# Patient Record
Sex: MALE | Race: ASIAN | Employment: OTHER | URBAN - METROPOLITAN AREA
[De-identification: names, ages, dates, MRNs, and addresses within clinical notes are randomized per-mention and may not be internally consistent; named-entity substitution may affect disease eponyms.]

---

## 2022-10-24 ENCOUNTER — HOSPITAL ENCOUNTER (EMERGENCY)
Dept: CT IMAGING | Age: 87
Discharge: HOME | End: 2022-10-27
Payer: MEDICARE

## 2022-10-24 ENCOUNTER — HOSPITAL ENCOUNTER (OUTPATIENT)
Age: 87
Setting detail: OBSERVATION
Discharge: HOME | End: 2022-10-25
Attending: EMERGENCY MEDICINE | Admitting: INTERNAL MEDICINE
Payer: MEDICARE

## 2022-10-24 ENCOUNTER — HOSPITAL ENCOUNTER (EMERGENCY)
Dept: GENERAL RADIOLOGY | Age: 87
Discharge: HOME | End: 2022-10-27
Payer: MEDICARE

## 2022-10-24 DIAGNOSIS — R07.9 CHEST PAIN, UNSPECIFIED TYPE: ICD-10-CM

## 2022-10-24 DIAGNOSIS — S00.03XA CONTUSION OF SCALP, INITIAL ENCOUNTER: ICD-10-CM

## 2022-10-24 DIAGNOSIS — R55 SYNCOPE AND COLLAPSE: Primary | ICD-10-CM

## 2022-10-24 LAB
ALBUMIN SERPL-MCNC: 3.9 G/DL (ref 3.2–4.6)
ALBUMIN/GLOB SERPL: 1 {RATIO} (ref 0.4–1.6)
ALP SERPL-CCNC: 77 U/L (ref 50–136)
ALT SERPL-CCNC: 24 U/L (ref 12–65)
ANION GAP SERPL CALC-SCNC: 6 MMOL/L (ref 2–11)
AST SERPL-CCNC: 29 U/L (ref 15–37)
BILIRUB SERPL-MCNC: 0.6 MG/DL (ref 0.2–1.1)
BUN SERPL-MCNC: 13 MG/DL (ref 8–23)
CALCIUM SERPL-MCNC: 9.3 MG/DL (ref 8.3–10.4)
CHLORIDE SERPL-SCNC: 100 MMOL/L (ref 101–110)
CO2 SERPL-SCNC: 29 MMOL/L (ref 21–32)
CREAT SERPL-MCNC: 0.87 MG/DL (ref 0.8–1.5)
EKG ATRIAL RATE: 77 BPM
EKG DIAGNOSIS: NORMAL
EKG P AXIS: 63 DEGREES
EKG P-R INTERVAL: 200 MS
EKG Q-T INTERVAL: 414 MS
EKG QRS DURATION: 134 MS
EKG QTC CALCULATION (BAZETT): 468 MS
EKG R AXIS: 52 DEGREES
EKG T AXIS: 50 DEGREES
EKG VENTRICULAR RATE: 77 BPM
ERYTHROCYTE [DISTWIDTH] IN BLOOD BY AUTOMATED COUNT: 11.9 % (ref 11.9–14.6)
GLOBULIN SER CALC-MCNC: 4 G/DL (ref 2.8–4.5)
GLUCOSE SERPL-MCNC: 122 MG/DL (ref 65–100)
HCT VFR BLD AUTO: 40.3 % (ref 41.1–50.3)
HGB BLD-MCNC: 14.2 G/DL (ref 13.6–17.2)
MCH RBC QN AUTO: 30.7 PG (ref 26.1–32.9)
MCHC RBC AUTO-ENTMCNC: 35.2 G/DL (ref 31.4–35)
MCV RBC AUTO: 87 FL (ref 82–102)
NRBC # BLD: 0 K/UL (ref 0–0.2)
PLATELET # BLD AUTO: 160 K/UL (ref 150–450)
PMV BLD AUTO: 9.5 FL (ref 9.4–12.3)
POTASSIUM SERPL-SCNC: 4.2 MMOL/L (ref 3.5–5.1)
PROT SERPL-MCNC: 7.9 G/DL (ref 6.3–8.2)
RBC # BLD AUTO: 4.63 M/UL (ref 4.23–5.6)
SODIUM SERPL-SCNC: 135 MMOL/L (ref 133–143)
TROPONIN I SERPL HS-MCNC: 5.5 PG/ML (ref 0–14)
TROPONIN I SERPL HS-MCNC: 6.3 PG/ML (ref 0–14)
TROPONIN I SERPL HS-MCNC: 9.3 PG/ML (ref 0–14)
WBC # BLD AUTO: 4.4 K/UL (ref 4.3–11.1)

## 2022-10-24 PROCEDURE — 96372 THER/PROPH/DIAG INJ SC/IM: CPT

## 2022-10-24 PROCEDURE — 6360000002 HC RX W HCPCS: Performed by: NURSE PRACTITIONER

## 2022-10-24 PROCEDURE — 36415 COLL VENOUS BLD VENIPUNCTURE: CPT

## 2022-10-24 PROCEDURE — 6370000000 HC RX 637 (ALT 250 FOR IP): Performed by: NURSE PRACTITIONER

## 2022-10-24 PROCEDURE — 85027 COMPLETE CBC AUTOMATED: CPT

## 2022-10-24 PROCEDURE — 71045 X-RAY EXAM CHEST 1 VIEW: CPT

## 2022-10-24 PROCEDURE — G0378 HOSPITAL OBSERVATION PER HR: HCPCS

## 2022-10-24 PROCEDURE — 93005 ELECTROCARDIOGRAM TRACING: CPT | Performed by: NURSE PRACTITIONER

## 2022-10-24 PROCEDURE — 72125 CT NECK SPINE W/O DYE: CPT

## 2022-10-24 PROCEDURE — 99285 EMERGENCY DEPT VISIT HI MDM: CPT | Performed by: EMERGENCY MEDICINE

## 2022-10-24 PROCEDURE — 80053 COMPREHEN METABOLIC PANEL: CPT

## 2022-10-24 PROCEDURE — 84484 ASSAY OF TROPONIN QUANT: CPT

## 2022-10-24 PROCEDURE — 2580000003 HC RX 258: Performed by: NURSE PRACTITIONER

## 2022-10-24 PROCEDURE — 70450 CT HEAD/BRAIN W/O DYE: CPT

## 2022-10-24 PROCEDURE — 93005 ELECTROCARDIOGRAM TRACING: CPT | Performed by: EMERGENCY MEDICINE

## 2022-10-24 RX ORDER — SODIUM CHLORIDE 9 MG/ML
INJECTION, SOLUTION INTRAVENOUS PRN
Status: DISCONTINUED | OUTPATIENT
Start: 2022-10-24 | End: 2022-10-25 | Stop reason: HOSPADM

## 2022-10-24 RX ORDER — METOPROLOL SUCCINATE 25 MG/1
25 TABLET, EXTENDED RELEASE ORAL DAILY
Status: DISCONTINUED | OUTPATIENT
Start: 2022-10-25 | End: 2022-10-25 | Stop reason: HOSPADM

## 2022-10-24 RX ORDER — POLYETHYLENE GLYCOL 3350 17 G/17G
17 POWDER, FOR SOLUTION ORAL DAILY PRN
Status: DISCONTINUED | OUTPATIENT
Start: 2022-10-24 | End: 2022-10-25 | Stop reason: HOSPADM

## 2022-10-24 RX ORDER — ACETAMINOPHEN 650 MG/1
650 SUPPOSITORY RECTAL EVERY 6 HOURS PRN
Status: DISCONTINUED | OUTPATIENT
Start: 2022-10-24 | End: 2022-10-25 | Stop reason: HOSPADM

## 2022-10-24 RX ORDER — SODIUM CHLORIDE, SODIUM LACTATE, POTASSIUM CHLORIDE, CALCIUM CHLORIDE 600; 310; 30; 20 MG/100ML; MG/100ML; MG/100ML; MG/100ML
INJECTION, SOLUTION INTRAVENOUS CONTINUOUS
Status: DISCONTINUED | OUTPATIENT
Start: 2022-10-24 | End: 2022-10-25 | Stop reason: HOSPADM

## 2022-10-24 RX ORDER — ATORVASTATIN CALCIUM 40 MG/1
40 TABLET, FILM COATED ORAL NIGHTLY
Status: DISCONTINUED | OUTPATIENT
Start: 2022-10-24 | End: 2022-10-25 | Stop reason: HOSPADM

## 2022-10-24 RX ORDER — ENOXAPARIN SODIUM 100 MG/ML
40 INJECTION SUBCUTANEOUS EVERY 24 HOURS
Status: DISCONTINUED | OUTPATIENT
Start: 2022-10-24 | End: 2022-10-25 | Stop reason: HOSPADM

## 2022-10-24 RX ORDER — EZETIMIBE 10 MG/1
10 TABLET ORAL DAILY
COMMUNITY

## 2022-10-24 RX ORDER — PANTOPRAZOLE SODIUM 40 MG/1
40 TABLET, DELAYED RELEASE ORAL
Status: DISCONTINUED | OUTPATIENT
Start: 2022-10-25 | End: 2022-10-25 | Stop reason: HOSPADM

## 2022-10-24 RX ORDER — TAMSULOSIN HYDROCHLORIDE 0.4 MG/1
CAPSULE ORAL DAILY
COMMUNITY

## 2022-10-24 RX ORDER — ONDANSETRON 2 MG/ML
4 INJECTION INTRAMUSCULAR; INTRAVENOUS EVERY 6 HOURS PRN
Status: DISCONTINUED | OUTPATIENT
Start: 2022-10-24 | End: 2022-10-25 | Stop reason: HOSPADM

## 2022-10-24 RX ORDER — ACETAMINOPHEN 325 MG/1
650 TABLET ORAL EVERY 6 HOURS PRN
Status: DISCONTINUED | OUTPATIENT
Start: 2022-10-24 | End: 2022-10-25 | Stop reason: HOSPADM

## 2022-10-24 RX ORDER — EZETIMIBE 10 MG/1
10 TABLET ORAL NIGHTLY
Status: DISCONTINUED | OUTPATIENT
Start: 2022-10-24 | End: 2022-10-25 | Stop reason: HOSPADM

## 2022-10-24 RX ORDER — SODIUM CHLORIDE 0.9 % (FLUSH) 0.9 %
5-40 SYRINGE (ML) INJECTION EVERY 12 HOURS SCHEDULED
Status: DISCONTINUED | OUTPATIENT
Start: 2022-10-24 | End: 2022-10-25 | Stop reason: HOSPADM

## 2022-10-24 RX ORDER — SODIUM CHLORIDE 0.9 % (FLUSH) 0.9 %
5-40 SYRINGE (ML) INJECTION PRN
Status: DISCONTINUED | OUTPATIENT
Start: 2022-10-24 | End: 2022-10-25 | Stop reason: HOSPADM

## 2022-10-24 RX ORDER — TAMSULOSIN HYDROCHLORIDE 0.4 MG/1
0.4 CAPSULE ORAL DAILY
Status: DISCONTINUED | OUTPATIENT
Start: 2022-10-25 | End: 2022-10-25 | Stop reason: HOSPADM

## 2022-10-24 RX ORDER — ASPIRIN 81 MG/1
81 TABLET, CHEWABLE ORAL DAILY
Status: DISCONTINUED | OUTPATIENT
Start: 2022-10-25 | End: 2022-10-25 | Stop reason: HOSPADM

## 2022-10-24 RX ORDER — ONDANSETRON 4 MG/1
4 TABLET, ORALLY DISINTEGRATING ORAL EVERY 8 HOURS PRN
Status: DISCONTINUED | OUTPATIENT
Start: 2022-10-24 | End: 2022-10-25 | Stop reason: HOSPADM

## 2022-10-24 RX ADMIN — ACETAMINOPHEN 650 MG: 325 TABLET, FILM COATED ORAL at 18:14

## 2022-10-24 RX ADMIN — SODIUM CHLORIDE, POTASSIUM CHLORIDE, SODIUM LACTATE AND CALCIUM CHLORIDE: 600; 310; 30; 20 INJECTION, SOLUTION INTRAVENOUS at 18:00

## 2022-10-24 RX ADMIN — SODIUM CHLORIDE, PRESERVATIVE FREE 10 ML: 5 INJECTION INTRAVENOUS at 20:26

## 2022-10-24 RX ADMIN — ENOXAPARIN SODIUM 40 MG: 100 INJECTION SUBCUTANEOUS at 17:07

## 2022-10-24 RX ADMIN — EZETIMIBE 10 MG: 10 TABLET ORAL at 20:26

## 2022-10-24 ASSESSMENT — ENCOUNTER SYMPTOMS
BACK PAIN: 0
EYE PAIN: 0
SHORTNESS OF BREATH: 0
VOMITING: 0
NAUSEA: 0
ABDOMINAL PAIN: 0
COUGH: 0

## 2022-10-24 ASSESSMENT — PAIN SCALES - GENERAL
PAINLEVEL_OUTOF10: 5
PAINLEVEL_OUTOF10: 5

## 2022-10-24 ASSESSMENT — PAIN - FUNCTIONAL ASSESSMENT: PAIN_FUNCTIONAL_ASSESSMENT: 0-10

## 2022-10-24 NOTE — ED TRIAGE NOTES
Pt states he started with chest pain this morning. Pt states when the chest pain started he felt dizzy and fell and hit his head. Pt states he now has pain and swelling to left side of head. Pt denies any shortness of breath. Pt states he called his family doctor and was told to come here because he has cardiac issues. Pt states he is unsure of what his cardiac issues are.

## 2022-10-24 NOTE — ED NOTES
TRANSFER - OUT REPORT:    Verbal report given to Adria Duffrosalia on Yamini Davies  being transferred to  for routine progression of patient care       Report consisted of patient's Situation, Background, Assessment and   Recommendations(SBAR). Information from the following report(s) Nurse Handoff Report, ED SBAR, and [facility] was reviewed with the receiving nurse. Lines:   Peripheral IV 10/24/22 Left Antecubital (Active)   Site Assessment Clean, dry & intact 10/24/22 1310   Line Status Blood return noted;Capped;Flushed 10/24/22 1310   Phlebitis Assessment No symptoms 10/24/22 1310   Infiltration Assessment 0 10/24/22 1310        Opportunity for questions and clarification was provided.       Patient transported with:  Aide Aj RN  10/24/22 5588

## 2022-10-24 NOTE — ACP (ADVANCE CARE PLANNING)
[facility] [place] [facility]  At the heart of better care     Advance Care Planning   Admit Date:  10/24/2022  1:01 PM   Name:  Delta Montanez   Age:  80 y.o. Sex:  male  :  1932   MRN:  [id number]   Room:  [facility]    Delta Montanez is able to make his own decisions:   Yes    If pt unable to make decisions, POA/surrogate decision maker:  Jah Rubalcava    Other people present:   NA    Patient / surrogate decision-maker directed code status:  Full code    Other ACP topics discussed, if applicable:   telemetry    Patient or surrogate consented to discussion of the current conditions, workup, management plans, prognosis, and the risk for further deterioration. Time spent: 30 minutes in direct discussion.       Signed:  NICHOLE Barreto CNP

## 2022-10-24 NOTE — H&P
Hospitalist History and Physical   Admit Date:  10/24/2022  1:01 PM   Name:  Quinton Kc   Age:  80 y.o. Sex:  male  :  1932   MRN:  [id number]   Room:      Presenting Complaint: Chest Pain and Head Injury     Reason(s) for Admission: Syncope and collapse [R55]     History of Present Illness:   Quinton Kc is a 80 y.o. male with medical history of CAD, BPH, HLD who presented with midsternal, nonradiating chest pressure onset yesterday and syncopal episode that occurred at home while he was standing. He denies any feeling of palpitations. He is followed by [facility] cardiology for CAD with stent placement 15 years prior, last seen in . 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 CT head and C spine negative for acute findings. CXR with questionable PNA. Afebrile, no leukocytosis. Patient denies any SOB or cough at home. Review of Systems:  10 systems reviewed and negative except as noted in HPI. Assessment & Plan:     Principal Problem:    Syncope and collapse  Plan: telemetry  Orthostatic vitals    Chest pressure:  Nonradiating. Onset yesterday  Telemetry  Trend troponins    BPH:  Flomax resumed    CAD:  ASA continued  Zetia continued, not on statin due to myalgias    HLD:  Zetia resumed        Anticipated discharge needs: Will have PT/OT Eval but anticipate dc tomorrow     Diet: ADULT DIET; Regular; Low Fat/Low Chol/High Fiber/AB; No Caffeine  VTE ppx: Lovenox SQ  Code status: Full Code    Hospital Problems:  Principal Problem:    Syncope and collapse  Resolved Problems:    * No resolved hospital problems. *       Past History:     History reviewed. No pertinent past medical history. History reviewed. No pertinent surgical history.      Social History     Tobacco Use    Smoking status: Not on file    Smokeless tobacco: Not on file   Substance Use Topics    Alcohol use: Not on file      Social History     Substance and Sexual Activity   Drug Use Not on file History reviewed. No pertinent family history. Immunization History   Administered Date(s) Administered    COVID-19, PFIZER PURPLE top, DILUTE for use, (age 15 y+), 30mcg/0.3mL 02/15/2021, 09/17/2021    Influenza Virus Vaccine 09/01/2014     No Known Allergies  Prior to Admit Medications:  No current outpatient medications      Objective:   Patient Vitals for the past 24 hrs:   Temp Pulse Resp BP SpO2   10/24/22 1247 98 °F (36.7 °C) 77 18 127/73 97 %       Oxygen Therapy  SpO2: 97 %    Estimated body mass index is 21.93 kg/m² as calculated from the following:    Height as of this encounter: 5' 7\" (1.702 m). Weight as of this encounter: 140 lb (63.5 kg). No intake or output data in the 24 hours ending 10/24/22 1556      Physical Exam:    Blood pressure 127/73, pulse 77, temperature 98 °F (36.7 °C), temperature source Oral, resp. rate 18, height 5' 7\" (1.702 m), weight 140 lb (63.5 kg), SpO2 97 %. General:    Well nourished. Head:  Normocephalic, atraumatic  Eyes:  Sclerae appear normal.  Pupils equally round. ENT:  Nares appear normal, no drainage. Moist oral mucosa  Neck:  No restricted ROM. Trachea midline   CV:   RRR. No m/r/g. No jugular venous distension. Lungs:   CTAB. No wheezing, rhonchi, or rales. Symmetric expansion. Abdomen: Bowel sounds present. Soft, nontender, nondistended. Extremities: No cyanosis or clubbing. No edema  Skin:     No rashes and normal coloration. Warm and dry. Neuro:  CN II-XII grossly intact. Sensation intact. A&Ox3  Psych:  Normal mood and affect.       I have personally reviewed labs and tests showing:  Recent Labs:  Recent Results (from the past 24 hour(s))   EKG 12 Lead    Collection Time: 10/24/22 12:43 PM   Result Value Ref Range    Ventricular Rate 77 BPM    Atrial Rate 77 BPM    P-R Interval 200 ms    QRS Duration 134 ms    Q-T Interval 414 ms    QTc Calculation (Bazett) 468 ms    P Axis 63 degrees    R Axis 52 degrees    T Axis 50 degrees Diagnosis Normal sinus rhythm    CBC    Collection Time: 10/24/22  1:09 PM   Result Value Ref Range    WBC 4.4 4.3 - 11.1 K/uL    RBC 4.63 4.23 - 5.6 M/uL    Hemoglobin 14.2 13.6 - 17.2 g/dL    Hematocrit 40.3 (L) 41.1 - 50.3 %    MCV 87.0 82.0 - 102.0 FL    MCH 30.7 26.1 - 32.9 PG    MCHC 35.2 (H) 31.4 - 35.0 g/dL    RDW 11.9 11.9 - 14.6 %    Platelets 332 955 - 328 K/uL    MPV 9.5 9.4 - 12.3 FL    nRBC 0.00 0.0 - 0.2 K/uL   Comprehensive Metabolic Panel    Collection Time: 10/24/22  1:09 PM   Result Value Ref Range    Sodium 135 133 - 143 mmol/L    Potassium 4.2 3.5 - 5.1 mmol/L    Chloride 100 (L) 101 - 110 mmol/L    CO2 29 21 - 32 mmol/L    Anion Gap 6 2 - 11 mmol/L    Glucose 122 (H) 65 - 100 mg/dL    BUN 13 8 - 23 MG/DL    Creatinine 0.87 0.8 - 1.5 MG/DL    Est, Glom Filt Rate >60 >60 ml/min/1.73m2    Calcium 9.3 8.3 - 10.4 MG/DL    Total Bilirubin 0.6 0.2 - 1.1 MG/DL    ALT 24 12 - 65 U/L    AST 29 15 - 37 U/L    Alk Phosphatase 77 50 - 136 U/L    Total Protein 7.9 6.3 - 8.2 g/dL    Albumin 3.9 3.2 - 4.6 g/dL    Globulin 4.0 2.8 - 4.5 g/dL    Albumin/Globulin Ratio 1.0 0.4 - 1.6     Troponin    Collection Time: 10/24/22  1:09 PM   Result Value Ref Range    Troponin, High Sensitivity 5.5 0 - 14 pg/mL       I have personally reviewed imaging studies showing:  CT HEAD WO CONTRAST    Result Date: 10/24/2022  CT of the head without contrast. CLINICAL INDICATION: Dizziness after head trauma from a fall PROCEDURE: Serial thin section axial images are obtained from the cranial vertex through the skull base without the administration of intravenous contrast. Radiation dose reduction techniques were used for this study. Our CT scanners use one or all of the following: Automated exposure control, adjusted of the mA and/or kV according to patient size, iterative reconstruction COMPARISON: No prior. FINDINGS: There is no acute intracranial hemorrhage, mass, or mass effect.  No abnormal extra-axial fluid collections identified. There is no hydrocephalus. The basilar cisterns are widely patent. The there is moderate bilateral cerebral atrophy. The gray-white brain parenchymal interface is maintained. The small amount of edema is noted over the left parietotemporal scalp without a discrete hematoma. No skull fracture or aggressive osseous lesion noted. The mastoid air cells and included paranasal sinuses are clear. 1. No acute intracranial abnormality. 2. Moderate cerebral atrophy. 3. Small amount of scalp edema or contusion along the left parietal temporal scalp without underlying skull fracture. CT CERVICAL SPINE WO CONTRAST    Result Date: 10/24/2022  CT of the cervical spine without contrast. CLINICAL INDICATION: Neck trauma after a fall PROCEDURE: Serial thin section axial images obtained through the cervical spine without the administration of intravenous contrast. Radiation dose reduction techniques were used for this study. Our CT scanners use one or all of the following: Automated exposure control, adjusted of the MA, or KV according to patient size, and iterative reconstruction. COMPARISON:No prior FINDINGS: The cervical vertebral bodies are normal in height and alignment. There is no fracture. Degenerative disc changes are noted at C3-C4, C5-C6 and C6-C7. The posterior elements are intact. Spinous processes are intact. The C1-C2 articulation is intact and normal. The craniocervical junction is normal. Axial images: The anterior and posterior arches of C1 are intact. The foramina transversarium are patent throughout. No fractures identified on the axial images. Limited evaluation the paraspinal soft tissues is unremarkable. 1. No acute osseous abnormality or malalignment of the cervical spine. 2. Multilevel degenerative spondylosis     XR CHEST PORTABLE    Result Date: 10/24/2022  Portable chest x-ray Clinical indications: Chest pain FINDINGS: Single AP view the chest submitted. There is no pleural effusion. There is no pneumothorax. The mediastinum is unremarkable. There is patchy opacity obscured the right heart border. A right middle lobe infiltrate cannot be excluded on this examination. The bones are normal.     Questionable opacity in the right middle lobe. Pneumonia cannot be excluded on this exam.      Echocardiogram:  No results found for this or any previous visit.         Orders Placed This Encounter   Medications    sodium chloride flush 0.9 % injection 5-40 mL    sodium chloride flush 0.9 % injection 5-40 mL    0.9 % sodium chloride infusion    OR Linked Order Group     ondansetron (ZOFRAN-ODT) disintegrating tablet 4 mg     ondansetron (ZOFRAN) injection 4 mg    OR Linked Order Group     acetaminophen (TYLENOL) tablet 650 mg     acetaminophen (TYLENOL) suppository 650 mg    polyethylene glycol (GLYCOLAX) packet 17 g    aspirin chewable tablet 81 mg    atorvastatin (LIPITOR) tablet 40 mg    enoxaparin (LOVENOX) injection 40 mg     Order Specific Question:   Indication of Use     Answer:   Prophylaxis-DVT/PE    ezetimibe (ZETIA) tablet 10 mg         Signed:  NICHOLE Tavarez - CNP

## 2022-10-24 NOTE — ED PROVIDER NOTES
[employer] Emergency Department Provider Note                   PCP:                Jabari Martinez MD               Age: 80 y.o. Sex: male       Tra Duckwroth is a 80 y.o. male who presents to the Emergency Department with chief complaint of    Chief Complaint   Patient presents with    Chest Pain    Head Injury      Patient states that yesterday he developed a midsternal chest pain. He describes as a mild pressure sensation that has been constant since yesterday. Patient states he has had this previously with cardiac issues. Patient states this morning around 8 AM he was standing and became lightheaded. He states he had a syncopal episode falling to the ground. He is unsure how long he was unconscious. Patient does have some mild pain and swelling to his left temple. Patient also has an abrasion to his left elbow but no pain. He states he has never passed out before. Patient denied any palpitations or shortness of breath during this episode today. He states he has been feeling well recently. The history is provided by the patient. All other systems reviewed and are negative. Review of Systems   Constitutional:  Negative for fatigue and fever. HENT:  Negative for nosebleeds. No facial injury   Eyes:  Negative for pain and visual disturbance. Respiratory:  Negative for cough and shortness of breath. Cardiovascular:  Positive for chest pain. Negative for palpitations and leg swelling. Gastrointestinal:  Negative for abdominal pain, nausea and vomiting. Genitourinary:  Negative for flank pain. Musculoskeletal:  Negative for arthralgias, back pain, myalgias and neck pain. Skin:  Positive for wound. Neurological:  Positive for dizziness. Negative for weakness, numbness and headaches. Psychiatric/Behavioral:  Negative for confusion. History reviewed. No pertinent past medical history. History reviewed. No pertinent surgical history. History reviewed.  No pertinent family history. Social History     Socioeconomic History    Marital status:      Spouse name: None    Number of children: None    Years of education: None    Highest education level: None        Allergies: Patient has no known allergies. Previous Medications    No medications on file        Vitals signs and nursing note reviewed. Patient Vitals for the past 4 hrs:   Temp Pulse Resp BP SpO2   10/24/22 1247 98 °F (36.7 °C) 77 18 127/73 97 %          Physical Exam  Vitals and nursing note reviewed. Constitutional:       Appearance: Normal appearance. HENT:      Head: Normocephalic. Comments: Mild swelling and tenderness to left temple. No facial tenderness or swelling     Nose: Nose normal.   Eyes:      Pupils: Pupils are equal, round, and reactive to light. Cardiovascular:      Rate and Rhythm: Normal rate and regular rhythm. Pulses: Normal pulses. Heart sounds: Normal heart sounds. Pulmonary:      Effort: Pulmonary effort is normal.      Breath sounds: Normal breath sounds. Chest:      Chest wall: No tenderness. Abdominal:      General: Abdomen is flat. Palpations: Abdomen is soft. Tenderness: There is no abdominal tenderness. There is no right CVA tenderness, left CVA tenderness, guarding or rebound. Musculoskeletal:         General: No swelling or tenderness. Normal range of motion. Cervical back: Normal range of motion and neck supple. No tenderness. Comments: Compartments are soft   Skin:     General: Skin is warm and dry. Neurological:      General: No focal deficit present. Mental Status: He is alert and oriented to person, place, and time. Sensory: No sensory deficit. Motor: No weakness.    Psychiatric:         Behavior: Behavior normal.        Orders Placed This Encounter   Procedures    CT HEAD WO CONTRAST    CT CERVICAL SPINE WO CONTRAST    XR CHEST PORTABLE    CBC    Comprehensive Metabolic Panel    Troponin Troponin    CBC    Basic Metabolic Panel w/ Reflex to MG    ADULT DIET;  Regular; Low Fat/Low Chol/High Fiber/AB; No Caffeine    Cardiac Monitor    Pulse Oximetry    Orthostatic blood pressure and pulse    Vital signs per unit routine    Daily weights    Intake and output    Telemetry monitoring - 24 hour duration    Notify physician    Up with assistance    Orthostatic blood pressure and pulse    Full code    OT eval and treat    PT evaluation and treat    Initiate Oxygen Therapy Protocol    EKG 12 Lead    EKG 12 lead    EKG 12 lead    Saline lock IV    Place in Observation Service       Procedures    ED EKG Interpretation  EKG was interpreted in the absence of a cardiologist.    Rate: Rate: Normal  EKG Interpretation: EKG Interpretation: sinus rhythm  ST Segments: Nonspecific ST segments - NO STEMI  Right bundle branch block which is old    Results Include:    Recent Results (from the past 24 hour(s))   EKG 12 Lead    Collection Time: 10/24/22 12:43 PM   Result Value Ref Range    Ventricular Rate 77 BPM    Atrial Rate 77 BPM    P-R Interval 200 ms    QRS Duration 134 ms    Q-T Interval 414 ms    QTc Calculation (Bazett) 468 ms    P Axis 63 degrees    R Axis 52 degrees    T Axis 50 degrees    Diagnosis Normal sinus rhythm    CBC    Collection Time: 10/24/22  1:09 PM   Result Value Ref Range    WBC 4.4 4.3 - 11.1 K/uL    RBC 4.63 4.23 - 5.6 M/uL    Hemoglobin 14.2 13.6 - 17.2 g/dL    Hematocrit 40.3 (L) 41.1 - 50.3 %    MCV 87.0 82.0 - 102.0 FL    MCH 30.7 26.1 - 32.9 PG    MCHC 35.2 (H) 31.4 - 35.0 g/dL    RDW 11.9 11.9 - 14.6 %    Platelets 135 948 - 186 K/uL    MPV 9.5 9.4 - 12.3 FL    nRBC 0.00 0.0 - 0.2 K/uL   Comprehensive Metabolic Panel    Collection Time: 10/24/22  1:09 PM   Result Value Ref Range    Sodium 135 133 - 143 mmol/L    Potassium 4.2 3.5 - 5.1 mmol/L    Chloride 100 (L) 101 - 110 mmol/L    CO2 29 21 - 32 mmol/L    Anion Gap 6 2 - 11 mmol/L    Glucose 122 (H) 65 - 100 mg/dL    BUN 13 8 - 23 MG/DL Creatinine 0.87 0.8 - 1.5 MG/DL    Est, Glom Filt Rate >60 >60 ml/min/1.73m2    Calcium 9.3 8.3 - 10.4 MG/DL    Total Bilirubin 0.6 0.2 - 1.1 MG/DL    ALT 24 12 - 65 U/L    AST 29 15 - 37 U/L    Alk Phosphatase 77 50 - 136 U/L    Total Protein 7.9 6.3 - 8.2 g/dL    Albumin 3.9 3.2 - 4.6 g/dL    Globulin 4.0 2.8 - 4.5 g/dL    Albumin/Globulin Ratio 1.0 0.4 - 1.6     Troponin    Collection Time: 10/24/22  1:09 PM   Result Value Ref Range    Troponin, High Sensitivity 5.5 0 - 14 pg/mL          CT HEAD WO CONTRAST   Final Result   1. No acute intracranial abnormality. 2. Moderate cerebral atrophy. 3. Small amount of scalp edema or contusion along the left parietal temporal   scalp without underlying skull fracture. CT CERVICAL SPINE WO CONTRAST   Final Result   1. No acute osseous abnormality or malalignment of the cervical spine. 2. Multilevel degenerative spondylosis         XR CHEST PORTABLE   Final Result   Questionable opacity in the right middle lobe. Pneumonia cannot be   excluded on this exam.                         ED Course as of 10/24/22 1533   Mon Oct 24, 2022   1508 Patient is resting comfortably in bed. I explained the results to patient as well as his son (on the phone) who is a physician. They are comfortable with admission. [CW]      ED Course User Index  [CW] Micehlle Crabtree MD       [facility]  Number of Diagnoses or Management Options  Chest pain, unspecified type  Syncope and collapse  Diagnosis management comments: Patient presented for evaluation of syncope which occurred earlier today. Patient is also been having some mild chest pain since yesterday. Patient's EKG showed normal sinus rhythm with a right bundle branch block which is old. No acute ST segment changes. Patient's troponin is negative and ACS has been ruled out. Patient's chest x-ray showed no acute findings. Patient does have history of coronary disease with previous PCI 15 years ago.   His chest pain controlled in the ED. Patient did strike his head during syncopal episode. His head CT showed scalp soft tissue swelling but no skull fracture or intracerebral hemorrhage. Because of patient's advanced age and cardiac history, hospitalist was consulted for admission. Explanation: The diagnosis and plan as well as the results of any testing and treatments today in the department were communicated to the patient and/or their family/caregiver (if present). The patient/caregiver verbalized understanding and realize that they are being admitted to the hospital for further evaluation and treatment. All questions were answered at bedside. ICD-10-CM    1. Syncope and collapse  R55       2. Chest pain, unspecified type  R07.9           DISPOSITION Admitted 10/24/2022 03:30:50 PM       Voice dictation software was used during the making of this note. This software is not perfect and grammatical and other typographical errors may be present. This note has not been completely proofread for errors.      Eliza Puri MD  10/24/22 8932

## 2022-10-24 NOTE — PROGRESS NOTES
Monitor room called, stated patient appeared to have 2nd degree type 2 rhythm. Also orthostatics on patient completed: 150/95 sitting and 132/62 standing.  Informed Ms. Page Gutierrez NP

## 2022-10-25 ENCOUNTER — APPOINTMENT (OUTPATIENT)
Dept: GENERAL RADIOLOGY | Age: 87
End: 2022-10-25
Payer: MEDICARE

## 2022-10-25 VITALS
HEART RATE: 78 BPM | BODY MASS INDEX: 21.97 KG/M2 | TEMPERATURE: 98.1 F | RESPIRATION RATE: 18 BRPM | OXYGEN SATURATION: 95 % | WEIGHT: 140 LBS | DIASTOLIC BLOOD PRESSURE: 70 MMHG | SYSTOLIC BLOOD PRESSURE: 123 MMHG | HEIGHT: 67 IN

## 2022-10-25 PROBLEM — I95.1 ORTHOSTATIC HYPOTENSION: Status: ACTIVE | Noted: 2022-10-25

## 2022-10-25 PROBLEM — R55 SYNCOPE AND COLLAPSE: Status: RESOLVED | Noted: 2022-10-24 | Resolved: 2022-10-25

## 2022-10-25 LAB
ANION GAP SERPL CALC-SCNC: 2 MMOL/L (ref 2–11)
BUN SERPL-MCNC: 10 MG/DL (ref 8–23)
CALCIUM SERPL-MCNC: 8.8 MG/DL (ref 8.3–10.4)
CHLORIDE SERPL-SCNC: 103 MMOL/L (ref 101–110)
CO2 SERPL-SCNC: 28 MMOL/L (ref 21–32)
CREAT SERPL-MCNC: 0.79 MG/DL (ref 0.8–1.5)
EKG ATRIAL RATE: 77 BPM
EKG DIAGNOSIS: NORMAL
EKG P AXIS: 63 DEGREES
EKG P-R INTERVAL: 200 MS
EKG Q-T INTERVAL: 414 MS
EKG QRS DURATION: 134 MS
EKG QTC CALCULATION (BAZETT): 468 MS
EKG R AXIS: 52 DEGREES
EKG T AXIS: 50 DEGREES
EKG VENTRICULAR RATE: 77 BPM
ERYTHROCYTE [DISTWIDTH] IN BLOOD BY AUTOMATED COUNT: 11.8 % (ref 11.9–14.6)
GLUCOSE SERPL-MCNC: 126 MG/DL (ref 65–100)
HCT VFR BLD AUTO: 37 % (ref 41.1–50.3)
HGB BLD-MCNC: 13.4 G/DL (ref 13.6–17.2)
MCH RBC QN AUTO: 31.2 PG (ref 26.1–32.9)
MCHC RBC AUTO-ENTMCNC: 36.2 G/DL (ref 31.4–35)
MCV RBC AUTO: 86.2 FL (ref 82–102)
NRBC # BLD: 0 K/UL (ref 0–0.2)
PLATELET # BLD AUTO: 140 K/UL (ref 150–450)
PMV BLD AUTO: 9.6 FL (ref 9.4–12.3)
POTASSIUM SERPL-SCNC: 4 MMOL/L (ref 3.5–5.1)
RBC # BLD AUTO: 4.29 M/UL (ref 4.23–5.6)
SODIUM SERPL-SCNC: 133 MMOL/L (ref 133–143)
WBC # BLD AUTO: 3.9 K/UL (ref 4.3–11.1)

## 2022-10-25 PROCEDURE — 97535 SELF CARE MNGMENT TRAINING: CPT

## 2022-10-25 PROCEDURE — 85027 COMPLETE CBC AUTOMATED: CPT

## 2022-10-25 PROCEDURE — G0378 HOSPITAL OBSERVATION PER HR: HCPCS

## 2022-10-25 PROCEDURE — 97165 OT EVAL LOW COMPLEX 30 MIN: CPT

## 2022-10-25 PROCEDURE — 80048 BASIC METABOLIC PNL TOTAL CA: CPT

## 2022-10-25 PROCEDURE — 97530 THERAPEUTIC ACTIVITIES: CPT

## 2022-10-25 PROCEDURE — 71045 X-RAY EXAM CHEST 1 VIEW: CPT

## 2022-10-25 PROCEDURE — 6370000000 HC RX 637 (ALT 250 FOR IP): Performed by: NURSE PRACTITIONER

## 2022-10-25 PROCEDURE — 97161 PT EVAL LOW COMPLEX 20 MIN: CPT

## 2022-10-25 PROCEDURE — 2580000003 HC RX 258: Performed by: NURSE PRACTITIONER

## 2022-10-25 PROCEDURE — 36415 COLL VENOUS BLD VENIPUNCTURE: CPT

## 2022-10-25 RX ADMIN — ASPIRIN 81 MG: 81 TABLET, CHEWABLE ORAL at 08:16

## 2022-10-25 RX ADMIN — PANTOPRAZOLE SODIUM 40 MG: 40 TABLET, DELAYED RELEASE ORAL at 05:50

## 2022-10-25 RX ADMIN — SODIUM CHLORIDE, POTASSIUM CHLORIDE, SODIUM LACTATE AND CALCIUM CHLORIDE: 600; 310; 30; 20 INJECTION, SOLUTION INTRAVENOUS at 05:51

## 2022-10-25 NOTE — PROGRESS NOTES
ACUTE PHYSICAL THERAPY GOALS:   (Developed with and agreed upon by patient and/or caregiver.)  STG:  (1.)Mr. Asher Humphrey will move from supine to sit and sit to supine  with INDEPENDENT within 7 treatment day(s). (2.)Mr. Asher Humphrey will transfer from bed to chair and chair to bed with SUPERVISION using the least restrictive device within 7 treatment day(s). (3.)Mr. Asher Humphrey will ambulate with SUPERVISION for 500 feet with the least restrictive device within 7 treatment day(s). (4)Mr. Asher Humphrey will perform HEP with cues and assistance to increase safety on his feet in 7 days. ________________________________________________________________________________________________      PHYSICAL THERAPY Initial Assessment and AM  (Link to Caseload Tracking: PT Visit Days : 1  Acknowledge Orders  Time In/Out  PT Charge Capture  Rehab Caseload Tracker    Torrie Finn is a 80 y.o. male   PRIMARY DIAGNOSIS: Syncope and collapse  Syncope and collapse [R55]  Contusion of scalp, initial encounter [S00.03XA]  Chest pain, unspecified type [R07.9]       Reason for Referral: Other abnormalities of gait and mobility (R26.89)  Observation: Payor: MEDICARE / Plan: MEDICARE PART A AND B / Product Type: *No Product type* /     ASSESSMENT:     REHAB RECOMMENDATIONS:   Recommendation to date pending progress:  Setting:  No further skilled therapy after discharge from hospital    Equipment:    None     ASSESSMENT:  Mr. Asher Humphrey presents as feeling well this am admitted with above diagnosis after syncope at home. He is normally independent and drives and helps take care of his wife. This am, he feels well. On contact, pt. Walking in [place] with son(lives in [facility]). He returned to room. He has a little pain on left side of head and mild swelling from the fall. LE strength and coordination good and symmetrical.  His BP's were taking and noted below. He had no dizziness throughout.   He was able to transfer and ambulate SBA in the christian.  Educated on ways to decrease risk of falling. Will follow one more visit if he does not go home today.        [address] [facility] [address] [address] AM-[facility] 6 Clicks Basic Mobility Inpatient Short Form  AM-PAC Mobility Inpatient   How much difficulty turning over in bed?: None  How much difficulty sitting down on / standing up from a chair with arms?: None  How much difficulty moving from lying on back to sitting on side of bed?: None  How much help from another person moving to and from a bed to a chair?: None  How much help from another person needed to walk in hospital room?: None  How much help from another person for climbing 3-5 steps with a railing?: A Little  AM-[facility] Inpatient Mobility Raw Score : 23  AM-PAC Inpatient T-Scale Score : 56.93  Mobility Inpatient CMS 0-100% Score: 11.2  Mobility Inpatient CMS G-Code Modifier : CI    SUBJECTIVE:   Mr. Leon Police states, \"good\"     Social/Functional Lives With: Spouse  Type of Home: House  Home Layout: Able to Live on Main level with bedroom/bathroom  Home Access: Level entry    OBJECTIVE:     PAIN: Denice Min / O2: Kristen Peters / Vicky Yo / Collin Dixie:   Pre Treatment:          Post Treatment: mild head pain left side, did not rate, did not change with movement Vitals   Vitals  BP: 123/70 (supine)  Blood Pressure Sittin/76  Blood Pressure Standin/63    Oxygen      None    RESTRICTIONS/PRECAUTIONS:                    GROSS EVALUATION: Intact Impaired (Comments):   AROM [x]  LE's   PROM []    Strength [x]  LE's   Balance [] Sitting - Static: Good  Sitting - Dynamic: Good  Standing - Static: Good  Standing - Dynamic: Good, Fair Neg rhomberg, single leg stance one sec each LE with recovery on his own, can do heel and toe raises   Posture [] Rounded Shoulders   Sensation [x]     Coordination [x]      Tone []     Edema []    Activity Tolerance [x]      []      COGNITION/  PERCEPTION: Intact Impaired (Comments):   Orientation [x]     Vision []  glasses   Hearing [x]     Cognition  [x] MOBILITY: I Mod I S SBA CGA Min Mod Max Total  NT x2 Comments:   Bed Mobility    Rolling [] [] [] [] [] [] [] [] [] [] []    Supine to Sit [] [] [x] [] [] [] [] [] [] [] []    Scooting [] [] [] [] [] [] [] [] [] [] []    Sit to Supine [] [] [x] [] [] [] [] [] [] [] []    Transfers    Sit to Stand [] [] [] [x] [] [] [] [] [] [] []    Bed to Chair [] [] [] [x] [] [] [] [] [] [] []    Stand to Sit [] [] [] [x] [] [] [] [] [] [] []     [] [] [] [] [] [] [] [] [] [] []    I=Independent, Mod I=Modified Independent, S=Supervision, SBA=Standby Assistance, CGA=Contact Guard Assistance,   Min=Minimal Assistance, Mod=Moderate Assistance, Max=Maximal Assistance, Total=Total Assistance, NT=Not Tested    GAIT: I Mod I S SBA CGA Min Mod Max Total  NT x2 Comments:   Level of Assistance [] [] [] [x] [] [] [] [] [] [] []    Distance 200 feet    DME None    Gait Quality Decreased cher     Weightbearing Status      Stairs  No steps at home    I=Independent, Mod I=Modified Independent, S=Supervision, SBA=Standby Assistance, CGA=Contact Guard Assistance,   Min=Minimal Assistance, Mod=Moderate Assistance, Max=Maximal Assistance, Total=Total Assistance, NT=Not Tested    PLAN:   FREQUENCY AND DURATION: Daily for duration of hospital stay or until stated goals are met, whichever comes first.    THERAPY PROGNOSIS: Good    PROBLEM LIST:   (Skilled intervention is medically necessary to address:)  Decreased Activity Tolerance  Decreased AROM/PROM  Decreased Balance  Decreased Gait Ability  Decreased Transfer Abilities INTERVENTIONS PLANNED:   (Benefits and precautions of physical therapy have been discussed with the patient.)  Therapeutic Activity  Therapeutic Exercise/HEP  Gait Training  Education       TREATMENT:   EVALUATION: LOW COMPLEXITY: (Untimed Charge)    TREATMENT:   Therapeutic Activity (15 Minutes):  Therapeutic activity included Supine to Sit, Sit to Supine, Transfer Training, Ambulation on level ground, Sitting balance , Standing balance, and education on not falling and reducing risk in home to improve functional Activity tolerance, Balance, Coordination, Mobility, Strength, and ROM. TREATMENT GRID:  N/A    AFTER TREATMENT PRECAUTIONS: Bed, Bed/Chair Locked, Call light within reach, Needs within reach, and Visitors at bedside    INTERDISCIPLINARY COLLABORATION:  RN/ PCT, PT/ PTA, and OT/ ELMORE    EDUCATION: Education Given To: Patient; Family  Education Provided: Role of Therapy; Fall Prevention Strategies  Education Method: Demonstration;Verbal  Education Outcome: Verbalized understanding    TIME IN/OUT:  Time In: 0805  Time Out: 0830  Minutes: FELIBERTO Umanzor

## 2022-10-25 NOTE — DISCHARGE SUMMARY
Hospitalist Discharge Summary   Admit Date:  10/24/2022  1:01 PM   DC Note date: 10/25/2022  Name:  Sharon Vazquez   Age:  80 y.o. Sex:  male  :  1932   MRN:  [id number]   Room:  [facility]  PCP:  Debbi Orozco MD    Presenting Complaint: Chest Pain and Head Injury     Initial Admission Diagnosis: Syncope and collapse [R55]  Contusion of scalp, initial encounter [S00.03XA]  Chest pain, unspecified type [R07.9]     Problem List for this Hospitalization (present on admission):    Principal Problem (Resolved):    Syncope and collapse  Active Problems:    * No active hospital problems. [facility] Course:  Mr. Mary Bejarano is a nice 81 y/o WM with a h/o CAD s/p PCI 15 years ago who was admitted to our service on 10/24 for syncope. The day prior to admission he had 1 episode of mild chest pain that self-resolved. The following day he was ambulating to the kitchen when he suddenly got dizzy and seemed to pass out. This was unwitnessed by wife was at home. No seizure like activity. Labs unremarkable. EKG shows RBBB. CXR with a questionable R sided infiltrate but patient afebrile without leukocytosis or respiratory symptoms. CT head with L sided scalp edema. CT C-spine was negative for acute fracture but did show multilevel degenerative spondylosis. He was admitted and started on IVFs and remote telemetry. There was 1 brief episode after admission that looked like a type 2 AVB but this resolved spontaneously and has not recurred. His orthostatic VS were positive. Patient advised to stay off Flomax for a few days to ensure improvement at discharge. He should follow up with his Cardiologist and may need a monitor. Repeat CXR was unremarkable on 10/25. He is on RA w/o complaints. He was seen by PT and did well. He did have positive orthostatics with PT but he was not symptomatic during his session. I discussed the plan of care with his son who is an anesthesiologist from [facility].  He is medically stable for discharge home today. Disposition: Home  Diet: ADULT DIET; Regular; Low Fat/Low Chol/High Fiber/AB; No Caffeine  Code Status: Full Code    Follow Ups:  Cardiology    Time spent in patient discharge and coordination 35 minutes. Follow up labs/diagnostics (ultimately defer to outpatient provider):  N/A    Plan was discussed with patient and son, RN, CM. All questions answered. Patient was stable at time of discharge. Instructions given to call a physician or return if any concerns. Current Discharge Medication List        CONTINUE these medications which have NOT CHANGED    Details   tamsulosin (FLOMAX) 0.4 MG capsule Take by mouth daily      ezetimibe (ZETIA) 10 MG tablet Take 10 mg by mouth daily             Procedures done this admission:  * No surgery found *    Consults this admission:  None    Echocardiogram results:  No results found for this or any previous visit. Diagnostic Imaging/Tests:   CT HEAD WO CONTRAST    Result Date: 10/24/2022  1. No acute intracranial abnormality. 2. Moderate cerebral atrophy. 3. Small amount of scalp edema or contusion along the left parietal temporal scalp without underlying skull fracture. CT CERVICAL SPINE WO CONTRAST    Result Date: 10/24/2022  1. No acute osseous abnormality or malalignment of the cervical spine. 2. Multilevel degenerative spondylosis     XR CHEST PORTABLE    Result Date: 10/24/2022  Questionable opacity in the right middle lobe.  Pneumonia cannot be excluded on this exam.       Labs: Results:       BMP, Mg, Phos Recent Labs     10/24/22  1309 10/25/22  0638    133   K 4.2 4.0   * 103   CO2 29 28   ANIONGAP 6 2   BUN 13 10   CREATININE 0.87 0.79*   LABGLOM >60 >60   CALCIUM 9.3 8.8   GLUCOSE 122* 126*      CBC Recent Labs     10/24/22  1309 10/25/22  0638   WBC 4.4 3.9*   RBC 4.63 4.29   HGB 14.2 13.4*   HCT 40.3* 37.0*   MCV 87.0 86.2   MCH 30.7 31.2   MCHC 35.2* 36.2*   RDW 11.9 11.8*    140*   MPV 9.5 9.6   NRBC 0.00 0.00      LFT Recent Labs     10/24/22  1309   BILITOT 0.6   ALKPHOS 77   AST 29   ALT 24   PROT 7.9   LABALBU 3.9   GLOB 4.0      Cardiac  Lab Results   Component Value Date/Time    TROPHS 9.3 10/24/2022 07:52 PM    TROPHS 6.3 10/24/2022 04:23 PM    TROPHS 5.5 10/24/2022 01:09 PM      Coags No results found for: PROTIME, INR, APTT   A1c No results found for: LABA1C, EAG   Lipids No results found for: CHOL, LDLCALC, LABVLDL, HDL, CHOLHDLRATIO, TRIG   Thyroid  No results found for: Lizet Mems     Most Recent UA No results found for: COLORU, APPEARANCE, SPECGRAV, LABPH, PROTEINU, GLUCOSEU, KETUA, BILIRUBINUR, BLOODU, UROBILINOGEN, NITRU, LEUKOCYTESUR, WBCUA, RBCUA, EPITHUA, BACTERIA, LABCAST, MUCUS     No results for input(s): CULTURE in the last 720 hours.     All Labs from Last 24 Hrs:  Recent Results (from the past 24 hour(s))   EKG 12 Lead    Collection Time: 10/24/22 12:43 PM   Result Value Ref Range    Ventricular Rate 77 BPM    Atrial Rate 77 BPM    P-R Interval 200 ms    QRS Duration 134 ms    Q-T Interval 414 ms    QTc Calculation (Bazett) 468 ms    P Axis 63 degrees    R Axis 52 degrees    T Axis 50 degrees    Diagnosis Normal sinus rhythm    EKG 12 lead    Collection Time: 10/24/22 12:43 PM   Result Value Ref Range    Ventricular Rate 77 BPM    Atrial Rate 77 BPM    P-R Interval 200 ms    QRS Duration 134 ms    Q-T Interval 414 ms    QTc Calculation (Bazett) 468 ms    P Axis 63 degrees    R Axis 52 degrees    T Axis 50 degrees    Diagnosis Normal sinus rhythm    CBC    Collection Time: 10/24/22  1:09 PM   Result Value Ref Range    WBC 4.4 4.3 - 11.1 K/uL    RBC 4.63 4.23 - 5.6 M/uL    Hemoglobin 14.2 13.6 - 17.2 g/dL    Hematocrit 40.3 (L) 41.1 - 50.3 %    MCV 87.0 82.0 - 102.0 FL    MCH 30.7 26.1 - 32.9 PG    MCHC 35.2 (H) 31.4 - 35.0 g/dL    RDW 11.9 11.9 - 14.6 %    Platelets 927 580 - 657 K/uL    MPV 9.5 9.4 - 12.3 FL    nRBC 0.00 0.0 - 0.2 K/uL   Comprehensive Metabolic Panel    Collection Time: 10/24/22  1:09 PM   Result Value Ref Range    Sodium 135 133 - 143 mmol/L    Potassium 4.2 3.5 - 5.1 mmol/L    Chloride 100 (L) 101 - 110 mmol/L    CO2 29 21 - 32 mmol/L    Anion Gap 6 2 - 11 mmol/L    Glucose 122 (H) 65 - 100 mg/dL    BUN 13 8 - 23 MG/DL    Creatinine 0.87 0.8 - 1.5 MG/DL    Est, Glom Filt Rate >60 >60 ml/min/1.73m2    Calcium 9.3 8.3 - 10.4 MG/DL    Total Bilirubin 0.6 0.2 - 1.1 MG/DL    ALT 24 12 - 65 U/L    AST 29 15 - 37 U/L    Alk Phosphatase 77 50 - 136 U/L    Total Protein 7.9 6.3 - 8.2 g/dL    Albumin 3.9 3.2 - 4.6 g/dL    Globulin 4.0 2.8 - 4.5 g/dL    Albumin/Globulin Ratio 1.0 0.4 - 1.6     Troponin    Collection Time: 10/24/22  1:09 PM   Result Value Ref Range    Troponin, High Sensitivity 5.5 0 - 14 pg/mL   Troponin    Collection Time: 10/24/22  4:23 PM   Result Value Ref Range    Troponin, High Sensitivity 6.3 0 - 14 pg/mL   Troponin    Collection Time: 10/24/22  7:52 PM   Result Value Ref Range    Troponin, High Sensitivity 9.3 0 - 14 pg/mL   CBC    Collection Time: 10/25/22  6:38 AM   Result Value Ref Range    WBC 3.9 (L) 4.3 - 11.1 K/uL    RBC 4.29 4.23 - 5.6 M/uL    Hemoglobin 13.4 (L) 13.6 - 17.2 g/dL    Hematocrit 37.0 (L) 41.1 - 50.3 %    MCV 86.2 82.0 - 102.0 FL    MCH 31.2 26.1 - 32.9 PG    MCHC 36.2 (H) 31.4 - 35.0 g/dL    RDW 11.8 (L) 11.9 - 14.6 %    Platelets 509 (L) 780 - 450 K/uL    MPV 9.6 9.4 - 12.3 FL    nRBC 0.00 0.0 - 0.2 K/uL   Basic Metabolic Panel w/ Reflex to MG    Collection Time: 10/25/22  6:38 AM   Result Value Ref Range    Sodium 133 133 - 143 mmol/L    Potassium 4.0 3.5 - 5.1 mmol/L    Chloride 103 101 - 110 mmol/L    CO2 28 21 - 32 mmol/L    Anion Gap 2 2 - 11 mmol/L    Glucose 126 (H) 65 - 100 mg/dL    BUN 10 8 - 23 MG/DL    Creatinine 0.79 (L) 0.8 - 1.5 MG/DL    Est, Glom Filt Rate >60 >60 ml/min/1.73m2    Calcium 8.8 8.3 - 10.4 MG/DL       No Known Allergies  Immunization History   Administered Date(s) Administered    COVID-19, PFIZER PURPLE top, DILUTE for use, (age 15 y+), 30mcg/0.3mL 02/15/2021, 09/17/2021    Influenza Virus Vaccine 09/01/2014       Recent Vital Data:  Patient Vitals for the past 24 hrs:   Temp Pulse Resp BP SpO2   10/25/22 0738 98.1 °F (36.7 °C) 78 18 135/75 95 %   10/25/22 0318 98.1 °F (36.7 °C) 80 18 121/69 96 %   10/25/22 0024 97.9 °F (36.6 °C) 81 18 114/64 95 %   10/24/22 2034 97.9 °F (36.6 °C) 99 18 116/73 94 %   10/24/22 1940 -- (!) 103 -- -- --   10/24/22 1711 -- 84 -- -- --   10/24/22 1645 97.8 °F (36.6 °C) 84 18 (!) 150/95 --   10/24/22 1642 -- -- -- (!) 150/95 --   10/24/22 1247 98 °F (36.7 °C) 77 18 127/73 97 %       Oxygen Therapy  SpO2: 95 %    Estimated body mass index is 21.93 kg/m² as calculated from the following:    Height as of this encounter: 5' 7\" (1.702 m). Weight as of this encounter: 140 lb (63.5 kg). No intake or output data in the 24 hours ending 10/25/22 0820      Physical Exam:  General:    Well nourished. No overt distress  Head:  Normocephalic. Some L temporal/parietal area edema following fall. Eyes:  Sclerae appear normal.  Pupils equally round. HENT:  Nares appear normal, no drainage. Moist mucous membranes  Neck:  No restricted ROM. Trachea midline  CV:   RRR. No m/r/g. No JVD. Mild L sided chest wall tenderness to palpation. Lungs:   Mild basilar rales. Normal effort, RA O2. Abdomen:   Soft, nontender, nondistended. Extremities: Warm and dry. No cyanosis or clubbing. No edema. Small laceration to L lateral epicondyle. Skin:     No rashes. Normal coloration  Neuro:  CN II-XII grossly intact. Psych:  Normal mood and affect. Signed:  Talat Keys MD    Part of this note may have been written by using a voice dictation software. The note has been proof read but may still contain some grammatical/other typographical errors.

## 2022-10-25 NOTE — PROGRESS NOTES
ACUTE OCCUPATIONAL THERAPY GOALS:   (Developed with and agreed upon by patient and/or caregiver.)  1. Patient will perform lower body dressing with supervision. 2. Patient will perform toileting and toilet transfer with supervision. 3. Patient will perform ADL functional mobility and tranfers in room with supervision. Goals to be achieved in 7 days. OCCUPATIONAL THERAPY Initial Assessment and AM       OT Visit Days: 1  Acknowledge Orders  Time  OT Charge Capture  Rehab Caseload Tracker      Dharmesh Haley is a 80 y.o. male   PRIMARY DIAGNOSIS: Syncope and collapse  Syncope and collapse [R55]  Contusion of scalp, initial encounter [S00.03XA]  Chest pain, unspecified type [R07.9]       Reason for Referral: Generalized Muscle Weakness (M62.81)  Observation: Payor: MEDICARE / Plan: MEDICARE PART A AND B / Product Type: *No Product type* /     ASSESSMENT:     REHAB RECOMMENDATIONS:   Recommendation to date pending progress:  Setting:  No further skilled therapy after discharge from hospital    Equipment:    None     ASSESSMENT:  Mr. Amada Null admitted with above diagnosis. Pt normally independent and drives. Pt helps take care of his wife. This am, pt feels well. Pt donned shoes with SBA. Pt completed household distance ambulation with SBA. Pt had no dizziness with ambulating. Pt left on EOB with son present. Pt would benefit from 1-2 more visits from occupational therapy to increase independence.       [address] [facility] [address] [address] [facility] 6 Clicks Daily Activity Inpatient Short Form:    AM-PAC Daily Activity Inpatient   How much help for putting on and taking off regular lower body clothing?: A Little  How much help for Bathing?: None  How much help for Toileting?: None  How much help for putting on and taking off regular upper body clothing?: None  How much help for taking care of personal grooming?: None  How much help for eating meals?: None  AM-PAC Inpatient Daily Activity Raw Score: 23  AM-PAC Inpatient ADL T-Scale Score : 51.12  ADL Inpatient CMS 0-100% Score: 15.86  ADL Inpatient CMS G-Code Modifier : CI           SUBJECTIVE:     Mr. Mary Bejarano states, \"I feel good\"     Social/Functional Lives With: Spouse  Type of Home: House  Home Layout: Able to Live on Main level with bedroom/bathroom  Home Access: Level entry    OBJECTIVE:     Mick Giles / Annabelle Darren / Cindy Richardsoner: NA    RESTRICTIONS/PRECAUTIONS:       PAIN: VITALS / O2:   Pre Treatment:          Post Treatment: mild headache on left side, did not rate       Vitals    Vitals  BP: 123/70 (supine)  Blood Pressure Sittin/76  Blood Pressure Standin/63      Oxygen            GROSS EVALUATION: INTACT IMPAIRED   (See Comments)   UE AROM [x] []   UE PROM [] []   Strength [x]  UEs     Posture / Balance [] Sitting - Static: Good  Sitting - Dynamic: Good  Standing - Static: Good  Standing - Dynamic: Good, Fair   Sensation [x]     Coordination [x]       Tone [x]       Edema []    Activity Tolerance [x]       Hand Dominance R [x] L []      COGNITION/  PERCEPTION: INTACT IMPAIRED   (See Comments)   Orientation [x]     Vision [x]     Hearing [x]     Cognition  [x]     Perception [x]       MOBILITY: I Mod I S SBA CGA Min Mod Max Total  NT x2 Comments:   Bed Mobility    Rolling [] [] [] [] [] [] [] [] [] [] []    Supine to Sit [] [] [x] [] [] [] [] [] [] [] []    Scooting [] [] [x] [] [] [] [] [] [] [] []    Sit to Supine [] [] [] [] [] [] [] [] [] [] []    Transfers    Sit to Stand [] [] [] [x] [] [] [] [] [] [] []    Bed to Chair [] [] [] [x] [] [] [] [] [] [] []    Stand to Sit [] [] [] [x] [] [] [] [] [] [] []    Tub/Shower [] [] [] [] [] [] [] [] [] [] []     Toilet [] [] [] [x] [] [] [] [] [] [] []      [] [] [] [] [] [] [] [] [] [] []    I=Independent, Mod I=Modified Independent, S=Supervision/Setup, SBA=Standby Assistance, CGA=Contact Guard Assistance, Min=Minimal Assistance, Mod=Moderate Assistance, Max=Maximal Assistance, Total=Total Assistance, NT=Not Tested    ACTIVITIES OF DAILY LIVING: I Mod I S SBA CGA Min Mod Max Total NT Comments   BASIC ADLs:              Upper Body Bathing  [] [] [] [] [] [] [] [] [] [x]    Lower Body Bathing [] [] [] [] [] [] [] [] [] [x]    Toileting [] [] [x] [] [] [] [] [] [] []    Upper Body Dressing [] [] [] [] [] [] [] [] [] [x]    Lower Body Dressing [] [] [x] [] [] [] [] [] [] []    Feeding [] [] [x] [] [] [] [] [] [] []    Grooming [] [] [] [] [] [] [] [] [] []    Personal Device Care [] [] [] [] [] [] [] [] [] []    Functional Mobility [] [] [] [x] [] [] [] [] [] []    I=Independent, Mod I=Modified Independent, S=Supervision/Setup, SBA=Standby Assistance, CGA=Contact Guard Assistance, Min=Minimal Assistance, Mod=Moderate Assistance, Max=Maximal Assistance, Total=Total Assistance, NT=Not Tested    PLAN:   FREQUENCY/DURATION   OT Plan of Care: 3 times/week for duration of hospital stay or until stated goals are met, whichever comes first.    PROBLEM LIST:   (Skilled intervention is medically necessary to address:)  Decreased ADL/Functional Activities  Decreased Activity Tolerance  Decreased Balance  Decreased Strength  Decreased Transfer Abilities   INTERVENTIONS PLANNED:  (Benefits and precautions of occupational therapy have been discussed with the patient.)  Self Care Training  Therapeutic Activity  Therapeutic Exercise/HEP  Neuromuscular Re-education  Manual Therapy  Education         TREATMENT:     EVALUATION: LOW COMPLEXITY: (Untimed Charge)    TREATMENT:   Self Care (15 minutes): Patient participated in toileting, lower body dressing, and self feeding ADLs in unsupported sitting and standing with minimal verbal cueing to increase independence, increase activity tolerance, and increase safety awareness. Patient also participated in functional mobility, functional transfer, and adaptive equipment training to increase independence and increase activity tolerance.      TREATMENT GRID:  N/A    AFTER TREATMENT PRECAUTIONS: Bed, Bed/Chair Locked, Call light within reach, Needs within reach, and Visitors at bedside    INTERDISCIPLINARY COLLABORATION:  RN/ PCT, PT/ PTA, and OT/ ELMORE    EDUCATION:  Education Given To: Patient  Education Provided: Role of Therapy  Education Method: Demonstration  Barriers to Learning: None  Education Outcome: Verbalized understanding;Demonstrated understanding    TOTAL TREATMENT DURATION AND TIME:  Time In: 0815  Time Out: 5668  Minutes: [address] [facility]

## 2022-10-25 NOTE — CARE COORDINATION
Medical record reviewed. Patient care plan reviewed in interdisciplinary rounds with the following disciplines: MD, nursing, case management, therapy, and nutrition services. Prior to admission, pt was living independently in his home with his wife. She has some balance issues and uses a walker for stability. Pt assists her with minor issues. The couple has a maid who assists with cooking and household chores. No discharge planning needs anticipated. Son, at bedside, confirmed that additional assistance / increased hours is available if necessary. Patient and son plan to discuss plans with pts PCP on their next visit and will adjust assistance accordingly. Patient is medically stable and ready for discharge today. 10/25/22 1128   Service Assessment   Patient Orientation Alert and Oriented;Person;Place;Situation;Self   Cognition Alert   History Provided By Patient   Primary Caregiver Self   Accompanied By/Relationship son, Sandie Shone Spouse/Significant Other;Children   PCP Verified by CM Yes  (Dr. Nolan Galindo)   Last Visit to PCP Within last 6 months   Prior Functional Level Independent in ADLs/IADLs   Current Functional Level Independent in ADLs/IADLs   Can patient return to prior living arrangement Yes   Ability to make needs known: Good   Family able to assist with home care needs: Yes   Would you like for me to discuss the discharge plan with any other family members/significant others, and if so, who?  Yes   Social/Functional History   Lives With Spouse   Type of [address] [place] [address] Assistance Independent   Homemaking Assistance Independent   Ambulation Assistance Independent   Transfer Assistance Independent   Active  Yes   Occupation Retired   Discharge Planning   Type of [facility] Spouse/Significant Other   Patient expects to be discharged to: [employer]

## 2023-06-12 ENCOUNTER — EXTERNAL RECORD (OUTPATIENT)
Dept: HEALTH INFORMATION MANAGEMENT | Facility: OTHER | Age: 88
End: 2023-06-12

## 2023-08-02 ENCOUNTER — OFFICE VISIT (OUTPATIENT)
Dept: GERIATRIC MEDICINE | Age: 88
End: 2023-08-02

## 2023-08-02 ENCOUNTER — HOSPITAL ENCOUNTER (EMERGENCY)
Age: 88
Discharge: HOME | End: 2023-08-02
Attending: EMERGENCY MEDICINE

## 2023-08-02 VITALS
WEIGHT: 134 LBS | HEIGHT: 65 IN | RESPIRATION RATE: 14 BRPM | TEMPERATURE: 97.2 F | OXYGEN SATURATION: 98 % | HEART RATE: 74 BPM | BODY MASS INDEX: 22.33 KG/M2 | SYSTOLIC BLOOD PRESSURE: 113 MMHG | DIASTOLIC BLOOD PRESSURE: 67 MMHG

## 2023-08-02 VITALS
DIASTOLIC BLOOD PRESSURE: 35 MMHG | WEIGHT: 134 LBS | TEMPERATURE: 96 F | BODY MASS INDEX: 22.33 KG/M2 | SYSTOLIC BLOOD PRESSURE: 91 MMHG | HEIGHT: 65 IN | OXYGEN SATURATION: 97 % | HEART RATE: 78 BPM

## 2023-08-02 DIAGNOSIS — R41.3 MEMORY LOSS: Primary | ICD-10-CM

## 2023-08-02 DIAGNOSIS — R73.01 ELEVATED FASTING GLUCOSE: ICD-10-CM

## 2023-08-02 DIAGNOSIS — I95.9 HYPOTENSIVE EPISODE: Primary | ICD-10-CM

## 2023-08-02 DIAGNOSIS — I95.0 IDIOPATHIC HYPOTENSION: ICD-10-CM

## 2023-08-02 LAB
ALBUMIN SERPL-MCNC: 3.6 G/DL (ref 3.6–5.1)
ALBUMIN/GLOB SERPL: 0.9 {RATIO} (ref 1–2.4)
ALP SERPL-CCNC: 74 UNITS/L (ref 45–117)
ALT SERPL-CCNC: 19 UNITS/L
ANION GAP SERPL CALC-SCNC: 12 MMOL/L (ref 7–19)
AST SERPL-CCNC: 18 UNITS/L
BASOPHILS # BLD: 0 K/MCL (ref 0–0.3)
BASOPHILS NFR BLD: 0 %
BILIRUB SERPL-MCNC: 0.6 MG/DL (ref 0.2–1)
BUN SERPL-MCNC: 19 MG/DL (ref 6–20)
BUN/CREAT SERPL: 20 (ref 7–25)
CALCIUM SERPL-MCNC: 9.3 MG/DL (ref 8.4–10.2)
CHLORIDE SERPL-SCNC: 97 MMOL/L (ref 97–110)
CO2 SERPL-SCNC: 29 MMOL/L (ref 21–32)
CREAT SERPL-MCNC: 0.94 MG/DL (ref 0.67–1.17)
DEPRECATED RDW RBC: 38 FL (ref 39–50)
EOSINOPHIL # BLD: 0 K/MCL (ref 0–0.5)
EOSINOPHIL NFR BLD: 1 %
ERYTHROCYTE [DISTWIDTH] IN BLOOD: 11.9 % (ref 11–15)
FASTING DURATION TIME PATIENT: ABNORMAL H
FOLATE SERPL-MCNC: >24 NG/ML
GFR SERPLBLD BASED ON 1.73 SQ M-ARVRAT: 77 ML/MIN
GLOBULIN SER-MCNC: 3.8 G/DL (ref 2–4)
GLUCOSE SERPL-MCNC: 97 MG/DL (ref 70–99)
HBA1C MFR BLD: 5.7 % (ref 4.5–5.6)
HCT VFR BLD CALC: 37.9 % (ref 39–51)
HGB BLD-MCNC: 13.4 G/DL (ref 13–17)
IMM GRANULOCYTES # BLD AUTO: 0 K/MCL (ref 0–0.2)
IMM GRANULOCYTES # BLD: 0 %
LYMPHOCYTES # BLD: 1.5 K/MCL (ref 1–4)
LYMPHOCYTES NFR BLD: 39 %
MCH RBC QN AUTO: 30.7 PG (ref 26–34)
MCHC RBC AUTO-ENTMCNC: 35.4 G/DL (ref 32–36.5)
MCV RBC AUTO: 86.9 FL (ref 78–100)
MONOCYTES # BLD: 0.6 K/MCL (ref 0.3–0.9)
MONOCYTES NFR BLD: 15 %
NEUTROPHILS # BLD: 1.7 K/MCL (ref 1.8–7.7)
NEUTROPHILS NFR BLD: 45 %
NRBC BLD MANUAL-RTO: 0 /100 WBC
PLATELET # BLD AUTO: 142 K/MCL (ref 140–450)
POTASSIUM SERPL-SCNC: 4.3 MMOL/L (ref 3.4–5.1)
PROT SERPL-MCNC: 7.4 G/DL (ref 6.4–8.2)
RBC # BLD: 4.36 MIL/MCL (ref 4.5–5.9)
SODIUM SERPL-SCNC: 134 MMOL/L (ref 135–145)
T4 FREE SERPL-MCNC: 0.7 NG/DL (ref 0.8–1.5)
TSH SERPL-ACNC: 6.99 MCUNITS/ML (ref 0.35–5)
VIT B12 SERPL-MCNC: >2000 PG/ML (ref 211–911)
WBC # BLD: 3.8 K/MCL (ref 4.2–11)

## 2023-08-02 PROCEDURE — 99284 EMERGENCY DEPT VISIT MOD MDM: CPT

## 2023-08-02 PROCEDURE — 85025 COMPLETE CBC W/AUTO DIFF WBC: CPT | Performed by: EMERGENCY MEDICINE

## 2023-08-02 PROCEDURE — 82746 ASSAY OF FOLIC ACID SERUM: CPT | Performed by: EMERGENCY MEDICINE

## 2023-08-02 PROCEDURE — 83036 HEMOGLOBIN GLYCOSYLATED A1C: CPT | Performed by: EMERGENCY MEDICINE

## 2023-08-02 PROCEDURE — 36415 COLL VENOUS BLD VENIPUNCTURE: CPT

## 2023-08-02 PROCEDURE — 84439 ASSAY OF FREE THYROXINE: CPT | Performed by: EMERGENCY MEDICINE

## 2023-08-02 PROCEDURE — 80053 COMPREHEN METABOLIC PANEL: CPT | Performed by: EMERGENCY MEDICINE

## 2023-08-02 PROCEDURE — 84443 ASSAY THYROID STIM HORMONE: CPT | Performed by: EMERGENCY MEDICINE

## 2023-08-02 PROCEDURE — 99205 OFFICE O/P NEW HI 60 MIN: CPT | Performed by: INTERNAL MEDICINE

## 2023-08-02 RX ORDER — TAMSULOSIN HYDROCHLORIDE 0.4 MG/1
CAPSULE ORAL DAILY
COMMUNITY

## 2023-08-02 RX ORDER — EZETIMIBE 10 MG/1
10 TABLET ORAL DAILY
COMMUNITY

## 2023-08-02 RX ORDER — CHOLECALCIFEROL (VITAMIN D3) 125 MCG
500 CAPSULE ORAL
COMMUNITY

## 2023-08-02 RX ORDER — MULTIVITAMIN/IRON/FOLIC ACID 18MG-0.4MG
TABLET ORAL DAILY
COMMUNITY

## 2023-08-02 ASSESSMENT — ENCOUNTER SYMPTOMS
CHILLS: 0
FEVER: 0
BLOOD IN STOOL: 0
FATIGUE: 0
NAUSEA: 0
HEADACHES: 0
VOMITING: 0
COUGH: 0
CHOKING: 0
DIARRHEA: 0
TROUBLE SWALLOWING: 0
FEVER: 0
LIGHT-HEADEDNESS: 0
UNEXPECTED WEIGHT CHANGE: 0
HALLUCINATIONS: 0
EYE DISCHARGE: 0
ABDOMINAL PAIN: 0
SHORTNESS OF BREATH: 0
SEIZURES: 0
WOUND: 0

## 2023-08-02 ASSESSMENT — PAIN SCALES - GENERAL: PAINLEVEL: 2

## 2023-08-02 ASSESSMENT — PATIENT HEALTH QUESTIONNAIRE - PHQ9
CLINICAL INTERPRETATION OF PHQ2 SCORE: NO FURTHER SCREENING NEEDED
SUM OF ALL RESPONSES TO PHQ9 QUESTIONS 1 AND 2: 0
1. LITTLE INTEREST OR PLEASURE IN DOING THINGS: NOT AT ALL
2. FEELING DOWN, DEPRESSED OR HOPELESS: NOT AT ALL
SUM OF ALL RESPONSES TO PHQ9 QUESTIONS 1 AND 2: 0

## 2023-10-02 ENCOUNTER — TELEPHONE (OUTPATIENT)
Dept: GERIATRIC MEDICINE | Age: 88
End: 2023-10-02

## 2024-08-19 ENCOUNTER — TELEPHONE (OUTPATIENT)
Age: 89
End: 2024-08-19